# Patient Record
(demographics unavailable — no encounter records)

---

## 2025-03-05 NOTE — ASSESSMENT
[FreeTextEntry1] : 75-year-old Latvian male who endorses a history of kidney stones passed naturally is evaluated for nocturia.   Likely secondary to enlarged prostate. Will start tamsulosin 0.4 mg nightly. Discussed side effects including hypotension, dizziness, fainting, nasal congestion and retrograde ejaculation.  Ordered PSA for prostate cancer screening.   Follow-up in 6 weeks.

## 2025-03-05 NOTE — HISTORY OF PRESENT ILLNESS
[FreeTextEntry1] : English Creole ,  ID# [id number]. Second English Creole  due to dropped call; Mihir ID# [id number]  75-year-old English male who endorses a history of kidney stones passed naturally is a new patient here for frequent urination during the night. He is accompanied by his sister-in-law.   Nocturia 3-4x nightly. Ongoing for the past 1 year. Previously used to have nocturia only 0-1x nightly. Endorses drinking fluids before bedtime. Drinks water half an hour before bed.  During wake hours urinates every 1-1.5 hours. Doesn't drink much fluids during the day because he does not get thirsty.  Sometimes drinks coffee and tea, 2-3x during the week. Does not drink soda.  Denies urinary straining, urinary stream intermittency.  Endorses normal urinary flow.  Endorses burning with urination sensation when he does not drink much water. Currently denies having dysuria.  Denies gross hematuria.   Endorses family history of prostate cancer in father.   Quit smoking in his 20s. Previously smoked socially.

## 2025-04-16 NOTE — ASSESSMENT
[FreeTextEntry1] : 75-year-old [place] Islander male who endorses a history of kidney stones passed naturally is following up for nocturia.   Improvement of nocturia with medication. Sent prescription for tamsulosin 0.4 mg nightly.  Reviewed labs and discussed with patient. - 03/05/2025: PSA 0.88; percent free PSA 42%  Follow up in 6 months or sooner if needed.

## 2025-04-16 NOTE — HISTORY OF PRESENT ILLNESS
[FreeTextEntry1] : Indonesian , [place] ID# [id number]  75-year-old Azerbaijani male history of kidney stones passed naturally is following up for nocturia.   Since last visit he started taking tamsulosin 0.4 mg nightly.  Patient reports nighttime urinary frequency improved from 4x to 2x nightly.  No complaints of dizziness or fainting with the medication.  No other urinary concerns.  Reviewed lab and discussed with patient. - 03/05/2025: PSA 0.88; percent free PSA 42%  Family history of prostate cancer in father.